# Patient Record
Sex: FEMALE | Race: WHITE | NOT HISPANIC OR LATINO | URBAN - METROPOLITAN AREA
[De-identification: names, ages, dates, MRNs, and addresses within clinical notes are randomized per-mention and may not be internally consistent; named-entity substitution may affect disease eponyms.]

---

## 2022-06-25 ENCOUNTER — EMERGENCY (EMERGENCY)
Facility: HOSPITAL | Age: 52
LOS: 1 days | Discharge: HOME | End: 2022-06-25
Admitting: EMERGENCY MEDICINE

## 2022-06-25 VITALS
SYSTOLIC BLOOD PRESSURE: 143 MMHG | DIASTOLIC BLOOD PRESSURE: 80 MMHG | HEART RATE: 65 BPM | TEMPERATURE: 98 F | RESPIRATION RATE: 18 BRPM | WEIGHT: 143.08 LBS | HEIGHT: 67 IN | OXYGEN SATURATION: 100 %
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
## 2022-06-25 DIAGNOSIS — Z88.8 ALLERGY STATUS TO OTHER DRUGS, MEDICAMENTS AND BIOLOGICAL SUBSTANCES STATUS: ICD-10-CM

## 2022-06-25 DIAGNOSIS — S91.331A PUNCTURE WOUND WITHOUT FOREIGN BODY, RIGHT FOOT, INITIAL ENCOUNTER: ICD-10-CM
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
## 2022-06-25 PROCEDURE — 99284 EMERGENCY DEPT VISIT MOD MDM: CPT

## 2022-06-25 NOTE — ED ADULT NURSE NOTE - NSIMPLEMENTINTERV_GEN_ALL_ED
Implemented All Universal Safety Interventions:  [place] to call system. Call bell, personal items and telephone within reach. Instruct patient to call for assistance. Room bathroom lighting operational. Non-slip footwear when patient is off stretcher. Physically safe environment: no spills, clutter or unnecessary equipment. Stretcher in lowest position, wheels locked, appropriate side rails in place.

## 2022-06-25 NOTE — ED PROVIDER NOTE - OBJECTIVE STATEMENT
50 yo F, denies pmhx, presenting to the ED c/o FB in the R heel x 2 days. Pt reports stepping on glass 1 week ago but says she did not feel pain until yesterday. Pt did note mild bleeding from area after the puncture wound but she can't directly recall if she was poked in the area that has pain now. She denies drainage from the area, fevers, chills, nausea, vomiting, diarrhea, headaches, or dizziness.

## 2022-06-25 NOTE — ED PROVIDER NOTE - CLINICAL SUMMARY MEDICAL DECISION MAKING FREE TEXT BOX
50 yo F, denies pmhx, presenting to the ED c/o FB in the R heel x 2 days. Pt found to have small puncture hole on R heel w/ slight palpable mass. XR offered to further evaluate the area but patient states she does not have time and will instead go to an [facility], where she will be heading to after this. Pt given ER return precautions for any worsened symptoms. She is stable on DC.

## 2022-08-18 NOTE — ED PROVIDER NOTE - PATIENT PORTAL LINK FT
done
You can access the FollowMyHealth Patient Portal offered by [facility] by registering at the following website: [email or web address]. By joining [facility]’s FollowMyHealth portal, you will also be able to view your health information using other applications (apps) compatible with our system.

## 2024-03-11 NOTE — ED ADULT TRIAGE NOTE - NSTRIAGECARE_GEN_A_ER
Med Requested:     Disp Refills Start End     DULoxetine (CYMBALTA) 30 MG capsule 90 capsule 1 4/18/2023 --    Sig - Route: Take 1 capsule by mouth every morning. - Oral        Last visit: 04/18/2023  Recommended Follow Up: 3 months  No Show/Cancel: 07/21/2023  Next visit: Visit date not found     Routing to provider for approval   
Face Mask

## 2025-01-28 NOTE — ED ADULT NURSE NOTE - CAS EDP DISCH TYPE
COLONOSCOPY: SUFLAVE     Re: [facility]   [address]  [facility] [address]    Provider: Jeff Sweeney MD    Your colon must be cleaned of stool to have a good view. You should follow these directions to have a successful colonoscopy.     Your exam is scheduled as an outpatient procedure on:     Day: Tuesday    Date: APRIL 8 2025    Arrival Time: You will receive a confirmation message 3 days before your appointment, if you do not receive a message or have questions, contact [phone] or visit the patient portal for details.). Be aware your procedure time is subject to change.)     You will be receiving sedation for your procedure and MUST have an adult over 18 to drive you home and be with you after the procedure.     Location: [place] [address]- Enter through the main entrance.     You will need the following in order to complete your prep:                  1. suflave 2- bottles and flavoring packets.       2. Two Simethicone tablets (OVER THE COUNTER)       3. Two Dulcolax (Bisacodyl) tablets (OVER THE COUNTER)    Your prep kit has been sent to   [id number] - [place], [place] - [address] AT [facility] [address]  [address]  [facility] [address]  Phone: [phone] Fax: [phone]  . Please  the kit today. If not picked up within 7 days contact your pharmacy directly as prescription would been placed back and re-stock.        7 days before colonoscopy: Review your colonoscopy instructions. (Instructions can also be found on scroll kitt under the letters tab under communication)  • Stop eating popcorn, nuts, flax/sesame seeds, or any food that contains seeds        3 BUSINESS DAYS BEFORE your procedure:  • Stop taking all anti-inflammatory medicines. These include Advil, Aleve, Naprosyn, (Tylenol is okay to take)        1 DAY BEFORE the procedure:     •Start a strict, clear liquid diet from the moment you  wake. A clear liquid diet can include Apple juice, white grape juice, and white cranberry juice; Beef or chicken broths that are clear - no noodles, vegetables, rice, etc. Tea and coffee without milk; -Soda pop, Gatorade, Miguel-Aid and various -Jell-O Flavors (any color except red or purple)  •Avoid: juices with pulp, milk, cream, solid food, alcohol, Gum, and hard candy.    5:00pm: start prepping bowel prep doses (for best taste, the prep should be refrigerated for 1 hour. Open 1 flavor enhancing packet and pour the contents into 1 bottle.   Fill the provided bottle with lukewarm water up to the fill line. After capping the bottle gently shake the bottle until all powder has dissolved. Repeat the same steps with the second bottle. Put both doses in the refrigerator (DO NOT FREEZE. Use within 24 hours.)    6:00pm: Retrieve bottle from the fridge. Drink 8 ounces of solution (about ¼ of the bottle) every 15 min until the bottle is empty. This should take you about an hour. Take the full hour.  Approximately 30 minutes after Chew one Simethicone (GasX) tablet.   If you experience preparation-related symptoms (e.g., nausea, bloating, cramping), pause or slow the rate of drinking the additional water until symptoms diminish.  Drink an additional 16 ounces of water during the evening whenever you feel comfortable.  Continue to drink clear liquids throughout the evening but do not eat any solid food.  6 hours prior to arrival time: Retrieve bottle from the fridge. Drink 8 ounces of solution (about ¼ of the bottle) every 15 min until the bottle is empty. This should take you about an hour. Take the full hour.  If you experience preparation-related symptoms (e.g., nausea, bloating, cramping), pause or slow the rate of drinking the additional water until symptoms diminish.  Approximately 30 minutes, Chew one Simethicone (GasX) tablet and swallow two Dulcolax (Bisacodyl) tablets.  Drink an additional 16 ounces of water during  the evening whenever you feel comfortable. (Prior to the cut off time)  4 hours prior to your arrival time, STOP ALL LIQUIDS INCLUDING WATER AT THIS TIME.}    • Take your medications; NONE  with a sip of water 4 hours prior to your arrival time. STOP ALL LIQUIDS INCLUDING WATER AT THIS TIME.    -Remove ALL jewelry and piercings (rings, necklaces, all body piercings, etc.) prior to arrival for procedure.    If you are still having solid/formed stools or trouble completing this preparation, please call the doctor's office at [phone].           Home